# Patient Record
Sex: FEMALE | Race: WHITE | NOT HISPANIC OR LATINO | URBAN - METROPOLITAN AREA
[De-identification: names, ages, dates, MRNs, and addresses within clinical notes are randomized per-mention and may not be internally consistent; named-entity substitution may affect disease eponyms.]

---

## 2019-09-24 ENCOUNTER — HOSPITAL ENCOUNTER (EMERGENCY)
Facility: MEDICAL CENTER | Age: 39
End: 2019-09-24
Attending: EMERGENCY MEDICINE
Payer: OTHER MISCELLANEOUS

## 2019-09-24 VITALS
DIASTOLIC BLOOD PRESSURE: 83 MMHG | HEART RATE: 100 BPM | RESPIRATION RATE: 18 BRPM | WEIGHT: 254.63 LBS | OXYGEN SATURATION: 96 % | TEMPERATURE: 97.7 F | BODY MASS INDEX: 42.42 KG/M2 | SYSTOLIC BLOOD PRESSURE: 135 MMHG | HEIGHT: 65 IN

## 2019-09-24 DIAGNOSIS — F13.20 BENZODIAZEPINE DEPENDENCE (HCC): ICD-10-CM

## 2019-09-24 DIAGNOSIS — F11.20 NARCOTIC DEPENDENCE (HCC): ICD-10-CM

## 2019-09-24 PROCEDURE — 99284 EMERGENCY DEPT VISIT MOD MDM: CPT

## 2019-09-24 SDOH — HEALTH STABILITY: MENTAL HEALTH: HOW OFTEN DO YOU HAVE A DRINK CONTAINING ALCOHOL?: MONTHLY OR LESS

## 2019-09-24 NOTE — ED PROVIDER NOTES
ED Provider Note    CHIEF COMPLAINT  Chief Complaint   Patient presents with   • Medication Refill     Was told to come in to see if she could get a 3 day refill of Xanax 1mg, Oxycodone HCl 30mg  until she returns home to [place] on the 5th.       HPI  Chantel Montiel is a 39 y.o. female who presents to the emergency department requesting a refill of OxyContin and Xanax.  Patient tells me that when she was on the airplane someone took her medications or that they were lost.  There is kind of a incoherent story with respect to what happened to the patient's medications.  Subsequently she tells me that the were found in a trash can in a bag and they returned in by someone to representative the airline but there was what seemed to be a handful of medications missing.  The overall story is not believable.    REVIEW OF SYSTEMS  See HPI for further details. All other systems are negative.     PAST MEDICAL HISTORY  History reviewed. No pertinent past medical history.    FAMILY HISTORY  History reviewed. No pertinent family history.    SOCIAL HISTORY  Social History     Socioeconomic History   • Marital status: Not on file     Spouse name: Not on file   • Number of children: Not on file   • Years of education: Not on file   • Highest education level: Not on file   Occupational History   • Not on file   Social Needs   • Financial resource strain: Not on file   • Food insecurity:     Worry: Not on file     Inability: Not on file   • Transportation needs:     Medical: Not on file     Non-medical: Not on file   Tobacco Use   • Smoking status: Never Smoker   • Smokeless tobacco: Never Used   Substance and Sexual Activity   • Alcohol use: Yes     Frequency: Monthly or less   • Drug use: Never   • Sexual activity: Not on file   Lifestyle   • Physical activity:     Days per week: Not on file     Minutes per session: Not on file   • Stress: Not on file   Relationships   • Social connections:     Talks on phone: Not on file      "Gets together: Not on file     Attends Jainism service: Not on file     Active member of club or organization: Not on file     Attends meetings of clubs or organizations: Not on file     Relationship status: Not on file   • Intimate partner violence:     Fear of current or ex partner: Not on file     Emotionally abused: Not on file     Physically abused: Not on file     Forced sexual activity: Not on file   Other Topics Concern   • Not on file   Social History Narrative   • Not on file       SURGICAL HISTORY  History reviewed. No pertinent surgical history.    CURRENT MEDICATIONS  Home Medications    **Home medications have not yet been reviewed for this encounter**         ALLERGIES  No Known Allergies    PHYSICAL EXAM  VITAL SIGNS: /83   Pulse 100   Temp 36.5 °C (97.7 °F) (Temporal)   Resp 18   Ht 1.651 m (5' 5\")   Wt 115.5 kg (254 lb 10.1 oz)   SpO2 96%   BMI 42.37 kg/m²    Constitutional: Well developed, Well nourished, No acute distress, Non-toxic appearance.  Patient appears to be under the influence of alcohol or drugs.  HENT: Normocephalic, Atraumatic, Bilateral external ears normal, Oropharynx moist, No oral exudates, Nose normal.  Somewhat slurred intoxicated sound and speech.  Eyes: PERRLA, EOMI, Conjunctiva normal, No discharge.   Neck: Normal range of motion, No tenderness, Supple, No stridor.   Cardiovascular: Regular rate and rhythm.  No murmurs  Thorax & Lungs: Clear to auscultation bilaterally.  No rales, rhonchi, or wheezing.  Abdomen: Bowel sounds normal, Soft, No tenderness, No masses, No pulsatile masses.   Skin: Warm, Dry, No erythema, No rash.   Back: No tenderness, No CVA tenderness.   Extremities: Intact distal pulses, No tenderness, No cyanosis, No clubbing.  Neurologic: Alert & oriented x 3, Normal motor function, Normal sensory function, No focal deficits noted.  No tremor.    EKG      RADIOLOGY/PROCEDURES      COURSE & MEDICAL DECISION MAKING  Pertinent Labs & Imaging " studies reviewed. (See chart for details)    The patient presents today with a history of having lost her medication or having had her medications stolen.  The provided history does not seem to be consistent and is frankly not believable.  I have reviewed the prescription monitoring program website which does show some prescriptions from [place] but last ones were in June.    In the emergency department the patient appears to be under the influence of drugs or alcohol.  Certainly could be under the influence of opiates and benzodiazepines.  I discussed with the patient that we have a policy where we do not refill controlled substances in the emergency department.  She certainly is at risk for benzodiazepine and opiate withdrawal.  She is clearly not having a significant withdrawal syndrome at this time given her clinical examination.  I am not comfortable refilling these medications.  In fact I discussed the patient that she likely should not be taking these medications on a routine basis.  She gets somewhat argumentative when I bring this up.  These medications will not be refilled in the emergency department today.    FINAL IMPRESSION  1. Narcotic dependence (HCC)    2. Benzodiazepine dependence (HCC)              Electronically signed by: Car Gallo, 9/24/2019 11:34 AM

## 2019-09-24 NOTE — ED TRIAGE NOTES
"Chief Complaint   Patient presents with   • Medication Refill     Was told to come in to see if she could get a 3 day refill of Xanax 1mg, Oxycodone HCl 30mg  until she returns home to [place] on the 5th.     /83   Pulse 100   Temp 36.5 °C (97.7 °F) (Temporal)   Resp 18   Ht 1.651 m (5' 5\")   Wt 115.5 kg (254 lb 10.1 oz)   SpO2 96%   BMI 42.37 kg/m²     "

## 2019-09-24 NOTE — ED NOTES
ERP at bedside. Pt agrees with plan of care discussed by ERP. AIDET acknowledged with patient. Boy in low position, side rail up for pt safety. Call light within reach. Will continue to monitor.